# Patient Record
Sex: MALE | Race: WHITE
[De-identification: names, ages, dates, MRNs, and addresses within clinical notes are randomized per-mention and may not be internally consistent; named-entity substitution may affect disease eponyms.]

---

## 2018-02-06 ENCOUNTER — HOSPITAL ENCOUNTER (OUTPATIENT)
Dept: HOSPITAL 72 - SUR | Age: 43
Discharge: HOME | End: 2018-02-06
Payer: COMMERCIAL

## 2018-02-06 VITALS — DIASTOLIC BLOOD PRESSURE: 68 MMHG | SYSTOLIC BLOOD PRESSURE: 112 MMHG

## 2018-02-06 VITALS — DIASTOLIC BLOOD PRESSURE: 72 MMHG | SYSTOLIC BLOOD PRESSURE: 120 MMHG

## 2018-02-06 VITALS — BODY MASS INDEX: 23.7 KG/M2 | WEIGHT: 175 LBS | HEIGHT: 72 IN

## 2018-02-06 VITALS — DIASTOLIC BLOOD PRESSURE: 70 MMHG | SYSTOLIC BLOOD PRESSURE: 116 MMHG

## 2018-02-06 VITALS — SYSTOLIC BLOOD PRESSURE: 123 MMHG | DIASTOLIC BLOOD PRESSURE: 77 MMHG

## 2018-02-06 VITALS — SYSTOLIC BLOOD PRESSURE: 125 MMHG | DIASTOLIC BLOOD PRESSURE: 69 MMHG

## 2018-02-06 VITALS — SYSTOLIC BLOOD PRESSURE: 118 MMHG | DIASTOLIC BLOOD PRESSURE: 68 MMHG

## 2018-02-06 VITALS — DIASTOLIC BLOOD PRESSURE: 71 MMHG | SYSTOLIC BLOOD PRESSURE: 127 MMHG

## 2018-02-06 DIAGNOSIS — M75.02: Primary | ICD-10-CM

## 2018-02-06 PROCEDURE — 94150 VITAL CAPACITY TEST: CPT

## 2018-02-06 PROCEDURE — 23350 INJECTION FOR SHOULDER X-RAY: CPT

## 2018-02-06 PROCEDURE — 94003 VENT MGMT INPAT SUBQ DAY: CPT

## 2018-02-06 PROCEDURE — 73020 X-RAY EXAM OF SHOULDER: CPT

## 2018-02-06 PROCEDURE — 76000 FLUOROSCOPY <1 HR PHYS/QHP: CPT

## 2018-02-06 PROCEDURE — 23700 MNPJ ANES SHO JT FIXJ APRATS: CPT

## 2018-02-06 NOTE — IMMEDIATE POST-OP EVALUATION
Immediate Post-Op Evalulation


Immediate Post-Op Evalulation


Procedure:  L Shoulder Manipulation under Anesthesia


Date of Evaluation:  Feb 6, 2018


Blood Products:  0


Pain Score (1-10):  1


Nausea:  No


Vomiting:  No


Complications


0


Patient Status:  awake, reacts, patent, extubated, none


Hydration Status:  adequate











Kwaku Pizano MD Feb 6, 2018 12:32

## 2018-02-06 NOTE — BRIEF OPERATIVE NOTE
Immediate Post Operative Note


Operative Note


Chief Complaint:  left shoulder pain


Pre-op Diagnosis:


left frozen shoulder


Procedure:


left shoulder emily with injection


Post-op Diagnosis:  same as pre-op


Findings:  consistent w/pre-op dx studies


Surgeon:  md poly


Assistant:  joseph askew


Anesthesiologist:  md judy


Anesthesia:  regional, MAC


Specimen:  none


Complications:  none


Condition:  stable


Fluids:  ns


Estimated Blood Loss:  none


Drains:  none


Implant(s) used?:  No











MALACHI ASKEW Feb 6, 2018 14:11

## 2018-02-06 NOTE — PRE-PROCEDURE NOTE/ATTESTATION
Pre-Procedure Note/Attestation


Complete Prior to Procedure


Planned Procedure:  left


Procedure Narrative:


left shoulder emily with injection





Indications for Procedure


Pre-Operative Diagnosis:


left frozen shoulder





Attestation


I attest that I discussed the nature of the procedure; its benefits; risks and 

complications; and alternatives (and the risks and benefits of such alternatives

), prior to the procedure, with the patient (or the patient's legal 

representative).





I attest that, if there was a reasonable possibility of needing a blood 

transfusion, the patient (or the patient's legal representative) was given the 

[facility] standardized written summary, pursuant 

to the [place] Blood Safety Act ([place] Health and Safety Code # 1645, as 

amended).





I attest that I re-evaluated the patient just prior to the surgery and that 

there has been no change in the patient's H&P, except as documented below: NONE











AARON MONSALVE Feb 6, 2018 07:03

## 2018-02-06 NOTE — 48 HOUR POST ANESTHESIA EVAL
Post Anesthesia Evaluation


Procedure:  L Shoulder Manipulation under Anesthesia


Date of Evaluation:  Feb 6, 2018


Airway:  patent


Nausea:  No


Vomiting:  No


Pain Intensity:  0


Hydration Status:  adequate


Cardiopulmonary Status:


Stable


Mental Status/LOC:  patient returned to baseline


Follow-up Care/Observations:


0


Post-Anesthesia Complications:


0


Follow-up care needed:  ready to discharge











Kwaku Pizano MD Feb 6, 2018 12:33

## 2018-02-06 NOTE — IMMEDIATE POST-OP EVALUATION
Immediate Post-Op Evalulation


Immediate Post-Op Evalulation


Procedure:  L Shoulder Manipulation under Anesthesia


Date of Evaluation:  Feb 6, 2018


Time of Evaluation:  14:23


Nausea:  No


Vomiting:  No











Get Duarte MD Feb 6, 2018 14:23

## 2018-02-06 NOTE — DIAGNOSTIC IMAGING REPORT
Indication: Pain, intraoperative

 

Technique: Intraoperative images

 

Comparison: none

 

Findings: Initial image demonstrates a localizer tool over the glenohumeral joint.

Subsequent images demonstrate contrast within the bursae and joint space

 

Impression: Intraoperative imaging, as described

## 2018-02-07 NOTE — OPERATIVE NOTE - DICTATED
DATE OF OPERATION:  02/06/2018



PREOPERATIVE DIAGNOSIS:  Left shoulder adhesive capsulitis.



POSTOPERATIVE DIAGNOSIS:  Left shoulder adhesive capsulitis.



PROCEDURE:

1. Left shoulder manipulation under anesthesia.

2. Left shoulder arthrogram.

3. Left shoulder intraarticular injection of 8 mL of ropivacaine and 2

mL of Kenalog 40 mg/mL.



SURGEON:  Raghu Berrios M.D.



ASSISTANT:  Stella Martinez PA-C.



ANESTHESIOLOGIST:  Dr. Lozada.



ANESTHESIA:  General mask anesthesia combined with interscalene block for

postoperative pain management.



ESTIMATED BLOOD LOSS:  None.



COMPLICATIONS:  None.



BRIEF HISTORY:  The patient is a pleasant 42-year-old gentleman who

developed adhesive capsulitis of bilateral shoulder post radiation.  He

was able to work through the adhesive capsulitis with physical therapy on

the right side, however, the left side remained symptomatic and failed

nonoperative treatment.  After full discussion of risks and benefits of

surgery and complications associated with it including infection,

bleeding, neurovascular complication, possibility of recurrence,

possibility of fractures, dislocations, or other complication may arise,

he opted for surgical treatment and manipulation under anesthesia with

injection.



OPERATIVE PROCEDURE:  The patient was brought to operating table and was

placed supine.  All pressure points were well padded.  Interscalene block

was performed by anesthesiologist.  General mask anesthesia was induced

and at this point, the left shoulder was gently manipulated in forward

flexion, abduction, external rotation, internal rotation, and adduction.

This released the capsule circumferentially.  Multiple gentle cracks could

be heard as the capsule was released.  Once this was completed, the image

intensifier was brought in.  The glenohumeral joint was identified.  After

the shoulder was prepped and draped in usual sterile fashion, an 18-gauge

needle was placed in.  Arthrogram of the shoulder was performed and

intra-articular placement of the needle was affirmed.  The rotator cuff

appeared to be intact and there was no extension of the dye material in

the subacromial space.  The inferior capsule appeared to have had a tear

from the manipulation, which was acceptable and expected.  Once this was

completed, the shoulder was injected with 8 mL of ropivacaine and 2 mL of

Kenalog 40 mg/mL without complication.  At this point, the needle was

removed and the shoulder was cleaned and a Band-Aid was applied.

The patient was then awakened and was taken to recovery room in stable

condition.  All lap counts and instrument counts were correct.









  ______________________________________________

  Raghu Berrios M.D.





DR:  Julia

D:  02/06/2018 14:12

T:  02/06/2018 23:02

JOB#:  [id number]

CC:



KATIE

## 2021-08-26 NOTE — ANETHESIA PREOPERATIVE EVAL
Anesthesia Pre-op PMH/ROS


General


Date of Evaluation:  Feb 6, 2018


Anesthesiologist:  Harinder


ASA Score:  ASA 3


Mallampati Score


Class I : Soft palate, uvula, fauces, pillars visible


Class II: Soft palate, uvula, fauces visible


Class III: Soft palate, base of uvula visible


Class IV: Only hard plate visible


Mallampati Classification:  Class II


Surgeon:  Agustina


Diagnosis:  L Shoulder Adhesive Capsulitis


Surgical Procedure:  L Shoulder Manipulation under Anesthesia


Anesthesia History:  none


Family History:  no anesthesia problems


Allergies:  


Coded Allergies:  


     No Known Allergies (Unverified , 2/6/18)


Medications:  see eMAR





Past Medical History


Gastrointestinal/Genitourinary:  Reports: GERD


PSxH Narrative:


L Knee Arthroscopy





Anesthesia Pre-op Phys. Exam


Physician Exam





Last Vital Signs








  Date Time  Temp Pulse Resp B/P (MAP) Pulse Ox O2 Delivery O2 Flow Rate FiO2


 


2/6/18 11:45 98.6 67 18 123/77 99 Room Air  








Constitutional:  NAD


Neurologic:  CN 2-12 intact


Cardiovascular:  RRR


Respiratory:  CTA


Gastrointestinal:  S/NT/ND





Airway Exam


Mallampati Score:  Class II


MO:  limited


ROM:  limited


Teeth:  intact





Anesthesia Pre-op A/P


Risk Assessment & Plan


Assessment:


ASA 3


Plan:


GA, L Supraclavicular Block


Status Change Before Surgery:  No





Pre-Antibiotics


Given Within 1 Hr of Incision:  Yes











Kwaku Pizano MD Feb 6, 2018 12:31
Anesthesia Pre-op PMH/ROS


General


Date of Evaluation:  Feb 6, 2018


Time of Evaluation:  13:00


ASA Score:  ASA 1


Mallampati Score


Class I : Soft palate, uvula, fauces, pillars visible


Class II: Soft palate, uvula, fauces visible


Class III: Soft palate, base of uvula visible


Class IV: Only hard plate visible


Mallampati Classification:  Class I


Allergies:  


Coded Allergies:  


     No Known Allergies (Unverified , 2/6/18)





Anesthesia Pre-op Phys. Exam


Physician Exam





Last Vital Signs








  Date Time  Temp Pulse Resp B/P (MAP) Pulse Ox O2 Delivery O2 Flow Rate FiO2


 


2/6/18 11:45 98.6 67 18 123/77 99 Room Air  











Airway Exam


Mallampati Score:  Class II











Get Duarte MD Feb 6, 2018 14:23
Endorsed to TIM [place] pt stable at this time.